# Patient Record
(demographics unavailable — no encounter records)

---

## 2024-11-23 NOTE — HISTORY OF PRESENT ILLNESS
[FreeTextEntry1] : Chief complaint  abdominal pain  HPI: Patient presents for followup of multiple complex Gastrointestinal signs and symptoms. Intermittent crampy rlq and ruq abdominal pain, possible choledocholithiasis   Intermittent crampy abdominal discomfort, dyspepsia and odynophagia.  New diagnostic test ordered, mri of abdoen with mrcp  New medication sent to pharmacy, cephalexin   Moderate degree of complexity of medical decision making involved in this patient encounter

## 2024-11-23 NOTE — ASSESSMENT
[FreeTextEntry1] : Patient presents for followup of multiple complex gastrointestinal signs and symptoms   New diagnostic test ordered for patient, mri of abdomen with mrcp  New prescription sent for patient, cephalexin   Moderate degree of complexity of medical decision making involved in this patient encounter

## 2025-03-03 NOTE — ASSESSMENT
[FreeTextEntry1] :  Imaging was reviewed and independently interpreted MRI [facility] 2/28/25 L gail - tricom deg, deg MMT, eff,  Bilateral X-Ray Examination of the KNEE (4 views): left mod-sev, right mod medial and patellofemoral degenerate changes.  exacerbation of underlying arthritis   - We discussed their diagnosis and treatment options at length including the risks and benefits of both surgical treatment with a knee replacement and non-surgical options. Surgical risks include but are not limited to pain, infection, bleeding, vascular injury, numbness, tingling, nerve damage. - Due to risks of surgery, they will continue conservative treatment with PT, icing, and anti-inflammatory medications - The patient was provided with a PT prescription to work on ROM, hip ER/abductors strengthening, quad/hamstring stretches and strengthening, and other exercises - The patient was advised to let pain guide the gradual advancement of activities. - We also discussed the possible of a corticosteroid injection in order to help decrease inflammation and pain so that they can perform better therapy. - The risks, benefits, and alternatives to corticosteroid injection were reviewed with the patient and they wished to proceed with this treatment course. - L knee CSI given, ap well - discussed possible PRP in the future - Follow up as needed in 6 weeks to re-evaluate, if no improvement we spoke about possibility of viscosupplementation injections    Medication Discussion: 1) We discussed a comprehensive treatment plan that included possible pharmaceutical management involving the use of prescription strength medications including but not limited to options such as oral Naprosyn 500mg BID, once daily Meloxicam 15 mg, or 500-650 mg Tylenol versus over the counter oral medications in addition to discussing possible topical prescription Pennsaid vs  Voltaren gel. 2) There is a moderate risk of morbidity with further treatment, especially from use of prescription strength medications and possible side effects of these medications which include but are not limited to upset stomach with oral medications, skin reactions to topical medications and GI/cardiac/renal issues with long term use. 3) I recommended that the patient follow-up with their medical physician if there are any significant potential issues with long term medication use such as interactions with current medications or with exacerbation of underlying medical comorbidities. 4) The benefits and risks associated with use of oral and / or topical prescription and over the counter anti-inflammatory medications were discussed with the patient. The patient voiced understanding of the risks including but not limited to bleeding, stroke, kidney dysfunction, heart disease, and were referred to the black box warning label for further information.

## 2025-03-03 NOTE — IMAGING
[de-identified] :  LEFT KNEE Inspection:  mild effusion Palpation: medial joint line tenderness, anterior tenderness Knee Range of Motion:  3-125  Strength: 5/5 Quadriceps strength, 5/5 Hamstring strength Neurological: light touch is intact throughout Ligament Stability and Special Tests:  McMurrays: neg Lachman: neg Pivot Shift: neg Posterior Drawer: neg Valgus: neg Varus: neg Patella Apprehension: neg Patella Maltracking: neg  RIGHT KNEE Inspection:  mild effusion Palpation: medial joint line tenderness, anterior tenderness Knee Range of Motion:  3-125  Strength: 5/5 Quadriceps strength, 5/5 Hamstring strength Neurological: light touch is intact throughout Ligament Stability and Special Tests:  McMurrays: neg Lachman: neg Pivot Shift: neg Posterior Drawer: neg Valgus: neg Varus: neg Patella Apprehension: neg Patella Maltracking: neg

## 2025-03-03 NOTE — HISTORY OF PRESENT ILLNESS
[de-identified] : 80 year old female  (retired  )  chronic B/L knee pain for years, L>R worsening since feb 2025 The pain is located  anterior and medial The pain is associated with  clciking, catching, swelling Worse with activity and better at rest. Has tried icing, moist heat, brace, Naproxen and muscle relaxers

## 2025-04-14 NOTE — HISTORY OF PRESENT ILLNESS
[de-identified] : 80 year old female  (retired  )  chronic B/L knee pain for years, L>R worsening since Feb 2025 The pain is located  anterior and medial The pain is associated with  clicking, catching, swelling Worse with activity and better at rest. Has tried icing, moist heat, brace, Naproxen and muscle relaxers  4/14/25- had L knee CSI (3/3), L>R pain still, doing PT at [facility] in [place]

## 2025-04-14 NOTE — ASSESSMENT
[FreeTextEntry1] : Imaging was reviewed and independently interpreted MRI [facility] 2/28/25 L gail - tricom deg, deg MMT, eff,    exacerbation of underlying arthritis   - We discussed their diagnosis and treatment options at length including the risks and benefits of both surgical treatment with a knee replacement and non-surgical options. Surgical risks include but are not limited to pain, infection, bleeding, vascular injury, numbness, tingling, nerve damage. - Due to risks of surgery, they will continue conservative treatment with PT, icing, and anti-inflammatory medications - The patient was provided with a PT prescription to work on ROM, hip ER/abductors strengthening, quad/hamstring stretches and strengthening, and other exercises - The patient was advised to let pain guide the gradual advancement of activities. - The patient was advised to apply ice (wrapped in a towel or protective covering) to the area daily (20 minutes at a time, 2-4X/day). - naprosyn rx - Patient was given a prescription for an anti-inflammatory medication.  They will take it for the next week and then on an as needed basis, as long as there are no medical contra-indications.  Patient is counseled on possible GI, renal, and cardiovascular side effects. - discussed possible PRP in the future - Follow up as needed in 6 weeks to re-evaluate, if no improvement we spoke about possibility of viscosupplementation injections

## 2025-04-14 NOTE — IMAGING
[de-identified] : LEFT KNEE Inspection:  mild effusion Palpation: medial joint line tenderness, anterior tenderness Knee Range of Motion:  3-125  Strength: 5/5 Quadriceps strength, 5/5 Hamstring strength Neurological: light touch is intact throughout Ligament Stability and Special Tests:  McMurrays: neg Lachman: neg Pivot Shift: neg Posterior Drawer: neg Valgus: neg Varus: neg Patella Apprehension: neg Patella Maltracking: neg  RIGHT KNEE Inspection:  mild effusion Palpation: medial joint line tenderness, anterior tenderness Knee Range of Motion:  3-125  Strength: 5/5 Quadriceps strength, 5/5 Hamstring strength Neurological: light touch is intact throughout Ligament Stability and Special Tests:  McMurrays: neg Lachman: neg Pivot Shift: neg Posterior Drawer: neg Valgus: neg Varus: neg Patella Apprehension: neg Patella Maltracking: neg

## 2025-04-17 NOTE — HISTORY OF PRESENT ILLNESS
[de-identified] : 80 year old female  (retired  )  chronic B/L knee pain for years, L>R worsening since Feb 2025 The pain is located  anterior and medial The pain is associated with  clicking, catching, swelling Worse with activity and better at rest. Has tried icing, moist heat, brace, Naproxen and muscle relaxers  4/14/25- had L knee CSI (3/3), L>R pain still, doing PT at [facility] in [place]  4/21/25 - continue to have knee pain

## 2025-04-17 NOTE — IMAGING
[de-identified] : LEFT KNEE Inspection:  mild effusion Palpation: medial joint line tenderness, anterior tenderness Knee Range of Motion:  3-125  Strength: 5/5 Quadriceps strength, 5/5 Hamstring strength Neurological: light touch is intact throughout Ligament Stability and Special Tests:  McMurrays: neg Lachman: neg Pivot Shift: neg Posterior Drawer: neg Valgus: neg Varus: neg Patella Apprehension: neg Patella Maltracking: neg  RIGHT KNEE Inspection:  mild effusion Palpation: medial joint line tenderness, anterior tenderness Knee Range of Motion:  3-125  Strength: 5/5 Quadriceps strength, 5/5 Hamstring strength Neurological: light touch is intact throughout Ligament Stability and Special Tests:  McMurrays: neg Lachman: neg Pivot Shift: neg Posterior Drawer: neg Valgus: neg Varus: neg Patella Apprehension: neg Patella Maltracking: neg

## 2025-04-17 NOTE — ASSESSMENT
[FreeTextEntry1] :  MRI [facility] 2/28/25 L gail - tricom deg, deg MMT, eff,  exacerbation of underlying arthritis   - We discussed their diagnosis and treatment options at length including the risks and benefits of both surgical treatment with a knee replacement and non-surgical options. Surgical risks include but are not limited to pain, infection, bleeding, vascular injury, numbness, tingling, nerve damage. - Due to risks of surgery, they will continue conservative treatment with PT, icing, and anti-inflammatory medications - The patient was provided with a PT prescription to work on ROM, hip ER/abductors strengthening, quad/hamstring stretches and strengthening, and other exercises - The patient was advised to let pain guide the gradual advancement of activities. - The patient was advised to apply ice (wrapped in a towel or protective covering) to the area daily (20 minutes at a time, 2-4X/day). - discussed possible PRP in the future - we spoke about possibility of viscosupplementation injections and they want to proceed - B/L knee Euflexxa #1 given, ap well    Medication Discussion: 1) We discussed a comprehensive treatment plan that included possible pharmaceutical management involving the use of prescription strength medications including but not limited to options such as oral Naprosyn 500mg BID, once daily Meloxicam 15 mg, or 500-650 mg Tylenol versus over the counter oral medications in addition to discussing possible topical prescription Pennsaid vs  Voltaren gel. 2) There is a moderate risk of morbidity with further treatment, especially from use of prescription strength medications and possible side effects of these medications which include but are not limited to upset stomach with oral medications, skin reactions to topical medications and GI/cardiac/renal issues with long term use. 3) I recommended that the patient follow-up with their medical physician if there are any significant potential issues with long term medication use such as interactions with current medications or with exacerbation of underlying medical comorbidities. 4) The benefits and risks associated with use of oral and / or topical prescription and over the counter anti-inflammatory medications were discussed with the patient. The patient voiced understanding of the risks including but not limited to bleeding, stroke, kidney dysfunction, heart disease, and were referred to the black box warning label for further information.

## 2025-04-21 NOTE — HISTORY OF PRESENT ILLNESS
[de-identified] : 80 year old female  (retired  )  chronic B/L knee pain for years, L>R worsening since Feb 2025 The pain is located  anterior and medial The pain is associated with  clicking, catching, swelling Worse with activity and better at rest. Has tried icing, moist heat, brace, Naproxen and muscle relaxers  4/14/25- had L knee CSI (3/3), L>R pain still, doing PT at [facility] in [place]  4/21/25 - continue to have knee pain

## 2025-04-21 NOTE — IMAGING
[de-identified] : LEFT KNEE Inspection:  mild effusion Palpation: medial joint line tenderness, anterior tenderness Knee Range of Motion:  3-125  Strength: 5/5 Quadriceps strength, 5/5 Hamstring strength Neurological: light touch is intact throughout Ligament Stability and Special Tests:  McMurrays: neg Lachman: neg Pivot Shift: neg Posterior Drawer: neg Valgus: neg Varus: neg Patella Apprehension: neg Patella Maltracking: neg  RIGHT KNEE Inspection:  mild effusion Palpation: medial joint line tenderness, anterior tenderness Knee Range of Motion:  3-125  Strength: 5/5 Quadriceps strength, 5/5 Hamstring strength Neurological: light touch is intact throughout Ligament Stability and Special Tests:  McMurrays: neg Lachman: neg Pivot Shift: neg Posterior Drawer: neg Valgus: neg Varus: neg Patella Apprehension: neg Patella Maltracking: neg

## 2025-04-28 NOTE — IMAGING
[de-identified] : LEFT KNEE Inspection:  mild effusion Palpation: medial joint line tenderness, anterior tenderness Knee Range of Motion:  3-125  Strength: 5/5 Quadriceps strength, 5/5 Hamstring strength Neurological: light touch is intact throughout Ligament Stability and Special Tests:  McMurrays: neg Lachman: neg Pivot Shift: neg Posterior Drawer: neg Valgus: neg Varus: neg Patella Apprehension: neg Patella Maltracking: neg  RIGHT KNEE Inspection:  mild effusion Palpation: medial joint line tenderness, anterior tenderness Knee Range of Motion:  3-125  Strength: 5/5 Quadriceps strength, 5/5 Hamstring strength Neurological: light touch is intact throughout Ligament Stability and Special Tests:  McMurrays: neg Lachman: neg Pivot Shift: neg Posterior Drawer: neg Valgus: neg Varus: neg Patella Apprehension: neg Patella Maltracking: neg

## 2025-04-28 NOTE — IMAGING
[de-identified] : LEFT KNEE Inspection:  mild effusion Palpation: medial joint line tenderness, anterior tenderness Knee Range of Motion:  3-125  Strength: 5/5 Quadriceps strength, 5/5 Hamstring strength Neurological: light touch is intact throughout Ligament Stability and Special Tests:  McMurrays: neg Lachman: neg Pivot Shift: neg Posterior Drawer: neg Valgus: neg Varus: neg Patella Apprehension: neg Patella Maltracking: neg  RIGHT KNEE Inspection:  mild effusion Palpation: medial joint line tenderness, anterior tenderness Knee Range of Motion:  3-125  Strength: 5/5 Quadriceps strength, 5/5 Hamstring strength Neurological: light touch is intact throughout Ligament Stability and Special Tests:  McMurrays: neg Lachman: neg Pivot Shift: neg Posterior Drawer: neg Valgus: neg Varus: neg Patella Apprehension: neg Patella Maltracking: neg

## 2025-04-28 NOTE — ASSESSMENT
[FreeTextEntry1] : MRI [facility] 2/28/25 L gail - tricom deg, deg MMT, eff,  exacerbation of underlying arthritis   - We discussed their diagnosis and treatment options at length including the risks and benefits of both surgical treatment with a knee replacement and non-surgical options. Surgical risks include but are not limited to pain, infection, bleeding, vascular injury, numbness, tingling, nerve damage. - Due to risks of surgery, they will continue conservative treatment with PT, icing, and anti-inflammatory medications - The patient was provided with a PT prescription to work on ROM, hip ER/abductors strengthening, quad/hamstring stretches and strengthening, and other exercises - The patient was advised to let pain guide the gradual advancement of activities. - The patient was advised to apply ice (wrapped in a towel or protective covering) to the area daily (20 minutes at a time, 2-4X/day). - discussed possible PRP in the future - we spoke about possibility of viscosupplementation injections and they want to proceed - B/L knee Euflexxa #2 given, ap well

## 2025-04-28 NOTE — HISTORY OF PRESENT ILLNESS
[de-identified] : 80 year old female  (retired  )  chronic B/L knee pain for years, L>R worsening since Feb 2025 The pain is located  anterior and medial The pain is associated with  clicking, catching, swelling Worse with activity and better at rest. Has tried icing, moist heat, brace, Naproxen and muscle relaxers  4/14/25- had L knee CSI (3/3), L>R pain still, doing PT at [facility] in [place]  4/21/25 - continue to have knee pain 4/28/25 - here for B/L knee Euflexxa #2

## 2025-04-28 NOTE — HISTORY OF PRESENT ILLNESS
[de-identified] : 80 year old female  (retired  )  chronic B/L knee pain for years, L>R worsening since Feb 2025 The pain is located  anterior and medial The pain is associated with  clicking, catching, swelling Worse with activity and better at rest. Has tried icing, moist heat, brace, Naproxen and muscle relaxers  4/14/25- had L knee CSI (3/3), L>R pain still, doing PT at [facility] in [place]  4/21/25 - continue to have knee pain 4/28/25 - here for B/L knee Euflexxa #2

## 2025-05-05 NOTE — HISTORY OF PRESENT ILLNESS
[de-identified] : 80 year old female  (retired  )  chronic B/L knee pain for years, L>R worsening since Feb 2025 The pain is located  anterior and medial The pain is associated with  clicking, catching, swelling Worse with activity and better at rest. Has tried icing, moist heat, brace, Naproxen and muscle relaxers  4/14/25- had L knee CSI (3/3), L>R pain still, doing PT at [facility] in [place]  4/21/25 - continue to have knee pain 4/28/25 - here for B/L knee Euflexxa #2 5/5/25 - here for B/L knee Euflexxa #3

## 2025-05-05 NOTE — IMAGING
[de-identified] : LEFT KNEE Inspection:  mild effusion Palpation: medial joint line tenderness, anterior tenderness Knee Range of Motion:  3-125  Strength: 5/5 Quadriceps strength, 5/5 Hamstring strength Neurological: light touch is intact throughout Ligament Stability and Special Tests:  McMurrays: neg Lachman: neg Pivot Shift: neg Posterior Drawer: neg Valgus: neg Varus: neg Patella Apprehension: neg Patella Maltracking: neg  RIGHT KNEE Inspection:  mild effusion Palpation: medial joint line tenderness, anterior tenderness Knee Range of Motion:  3-125  Strength: 5/5 Quadriceps strength, 5/5 Hamstring strength Neurological: light touch is intact throughout Ligament Stability and Special Tests:  McMurrays: neg Lachman: neg Pivot Shift: neg Posterior Drawer: neg Valgus: neg Varus: neg Patella Apprehension: neg Patella Maltracking: neg

## 2025-05-05 NOTE — ASSESSMENT
[FreeTextEntry1] : MRI [facility] 2/28/25 L gail - tricom deg, deg MMT, eff,  exacerbation of underlying arthritis   - We discussed their diagnosis and treatment options at length including the risks and benefits of both surgical treatment with a knee replacement and non-surgical options. Surgical risks include but are not limited to pain, infection, bleeding, vascular injury, numbness, tingling, nerve damage. - Due to risks of surgery, they will continue conservative treatment with PT, icing, and anti-inflammatory medications - The patient was provided with a PT prescription to work on ROM, hip ER/abductors strengthening, quad/hamstring stretches and strengthening, and other exercises - The patient was advised to let pain guide the gradual advancement of activities. - The patient was advised to apply ice (wrapped in a towel or protective covering) to the area daily (20 minutes at a time, 2-4X/day). - discussed possible PRP in the future - we spoke about possibility of viscosupplementation injections and they want to proceed - B/L knee Euflexxa #3 given, ap well

## 2025-05-05 NOTE — IMAGING
[de-identified] : LEFT KNEE Inspection:  mild effusion Palpation: medial joint line tenderness, anterior tenderness Knee Range of Motion:  3-125  Strength: 5/5 Quadriceps strength, 5/5 Hamstring strength Neurological: light touch is intact throughout Ligament Stability and Special Tests:  McMurrays: neg Lachman: neg Pivot Shift: neg Posterior Drawer: neg Valgus: neg Varus: neg Patella Apprehension: neg Patella Maltracking: neg  RIGHT KNEE Inspection:  mild effusion Palpation: medial joint line tenderness, anterior tenderness Knee Range of Motion:  3-125  Strength: 5/5 Quadriceps strength, 5/5 Hamstring strength Neurological: light touch is intact throughout Ligament Stability and Special Tests:  McMurrays: neg Lachman: neg Pivot Shift: neg Posterior Drawer: neg Valgus: neg Varus: neg Patella Apprehension: neg Patella Maltracking: neg  PROVIDER:[TOKEN:[[id number]],FOLLOWUP:[1 month]],PROVIDER:[TOKEN:[[id number]],FOLLOWUP:[2 weeks]]

## 2025-05-05 NOTE — HISTORY OF PRESENT ILLNESS
[de-identified] : 80 year old female  (retired  )  chronic B/L knee pain for years, L>R worsening since Feb 2025 The pain is located  anterior and medial The pain is associated with  clicking, catching, swelling Worse with activity and better at rest. Has tried icing, moist heat, brace, Naproxen and muscle relaxers  4/14/25- had L knee CSI (3/3), L>R pain still, doing PT at [facility] in [place]  4/21/25 - continue to have knee pain 4/28/25 - here for B/L knee Euflexxa #2 5/5/25 - here for B/L knee Euflexxa #3

## 2025-05-19 NOTE — ASSESSMENT
[FreeTextEntry1] :  ASSESSMENT  Patient with epigastric abdominal pain , melena and nausea concern for bleeding ulcer of stomach or duodenum  PLAN [place] diet advised  Patient scheduled for  Upper gastrointestinal endoscopy to be scheduled. The risks, benefits, alternatives of procedure were reviewed with patient, who gives informed consent to proceed.  Medications, allergies, and problem list were reviewed and reconciled.

## 2025-05-19 NOTE — PHYSICAL EXAM

## 2025-06-12 NOTE — ASSESSMENT
[FreeTextEntry1] :  ASSESSMENT  Acute flare of reflux esophagitis and gastritis   PLAN GERD diet reviewed with patient. [facility] Medications, allergies, and problem list were reviewed and reconciled.

## 2025-06-12 NOTE — PHYSICAL EXAM

## 2025-06-12 NOTE — HISTORY OF PRESENT ILLNESS
[FreeTextEntry1] :  Chief complaint: Epigastric discomfort, dyspepsia   HPI:  Patient presents for Gastroenterology evaluation because of multiple complex Gastrointestinal issues. Patient with flare of reflux esophagitis with dyspepsia, odynophagia and some epigastric abdominal sensitivity. I reviewed multiple results and reports with the patient including report of endoscopy and of endoscopic histopathology.   New prescription sent to pharmacy for esomeprazole.   GERD diet reviewed with patient.    As part of this complex Gastrointestinal evaluation, I extensively reviewed prior medical records on the patient including laboratory reports, medical imaging reports, and subspecialty consultations.   No sign of acute gi bleeding such as hematemesis, melena or hematochezia. No dyspepsia, dysphagia or odynophagia. Medications, allergies, and problem list were reviewed and reconciled.